# Patient Record
Sex: FEMALE | Race: WHITE | NOT HISPANIC OR LATINO | Employment: FULL TIME | URBAN - METROPOLITAN AREA
[De-identification: names, ages, dates, MRNs, and addresses within clinical notes are randomized per-mention and may not be internally consistent; named-entity substitution may affect disease eponyms.]

---

## 2024-06-27 ENCOUNTER — TRANSFERRED RECORDS (OUTPATIENT)
Dept: HEALTH INFORMATION MANAGEMENT | Facility: CLINIC | Age: 49
End: 2024-06-27
Payer: COMMERCIAL

## 2024-07-17 ENCOUNTER — OFFICE VISIT (OUTPATIENT)
Dept: CARDIOLOGY | Facility: CLINIC | Age: 49
End: 2024-07-17
Payer: COMMERCIAL

## 2024-07-17 VITALS
HEART RATE: 95 BPM | SYSTOLIC BLOOD PRESSURE: 128 MMHG | WEIGHT: 141.8 LBS | OXYGEN SATURATION: 99 % | RESPIRATION RATE: 20 BRPM | DIASTOLIC BLOOD PRESSURE: 82 MMHG | BODY MASS INDEX: 28.59 KG/M2 | HEIGHT: 59 IN

## 2024-07-17 DIAGNOSIS — R55 PRE-SYNCOPE: ICD-10-CM

## 2024-07-17 DIAGNOSIS — R42 LIGHT HEADEDNESS: Primary | ICD-10-CM

## 2024-07-17 DIAGNOSIS — I10 ESSENTIAL HYPERTENSION: ICD-10-CM

## 2024-07-17 DIAGNOSIS — F17.210 CIGARETTE NICOTINE DEPENDENCE WITHOUT COMPLICATION: ICD-10-CM

## 2024-07-17 PROCEDURE — 99204 OFFICE O/P NEW MOD 45 MIN: CPT | Performed by: INTERNAL MEDICINE

## 2024-07-17 RX ORDER — DEXTROAMPHETAMINE SACCHARATE, AMPHETAMINE ASPARTATE MONOHYDRATE, DEXTROAMPHETAMINE SULFATE AND AMPHETAMINE SULFATE 7.5; 7.5; 7.5; 7.5 MG/1; MG/1; MG/1; MG/1
1 CAPSULE, EXTENDED RELEASE ORAL DAILY
COMMUNITY
Start: 2024-06-11

## 2024-07-17 RX ORDER — DEXTROAMPHETAMINE SACCHARATE, AMPHETAMINE ASPARTATE, DEXTROAMPHETAMINE SULFATE AND AMPHETAMINE SULFATE 2.5; 2.5; 2.5; 2.5 MG/1; MG/1; MG/1; MG/1
10 TABLET ORAL PRN
COMMUNITY
Start: 2024-06-11

## 2024-07-17 RX ORDER — LISINOPRIL 10 MG/1
2 TABLET ORAL DAILY
COMMUNITY
Start: 2024-07-01 | End: 2025-07-01

## 2024-07-17 RX ORDER — FLUOXETINE 40 MG/1
1 CAPSULE ORAL DAILY
COMMUNITY
Start: 2024-06-13

## 2024-07-17 NOTE — PROGRESS NOTES
[facility]   [address]  [place] [address]   [email or web address]   OFFICE: [phone]     CARDIOLOGY CLINIC NOTE     Thank you, Ariel Noguera, for asking the [facility] team to see Ms. Gabriela Castillo to evaluate New Patient         Assessment/Recommendations   Assessment:    Near-syncope - most likely related to hyponatremia and hydrochlorothiazide given significant improvement in symptoms after stopping hydrochlorothiazide. With minimal symptoms in the past few weeks, will evaluate with an echocardiogram and defer on a cardiac rhythm monitor unless she develops recurrent symptoms.  Hypertension - reasonably controlled.  Nicotine use - smokes about 1/2 ppd for 30 years.    Plan:  Echocardiogram  Consider cardiac rhythm monitor with recurrent symptoms.  Strongly encouraged smoking cessation.  Follow up as needed           History of Present Illness   Ms. Gabriela Castillo is a 49 year old female with a significant past history of hypertension who presents for evaluation of dizziness.    Ms. Castillo was recently hospitalized for hyponatremia. She presented with dizziness and light headedness. Her sodium was 127. She was treated with IV fluids and her hydrochlorothiazide was discontinued. Since this hospitalization she has generally felt much better. She has had only one episode of mild light headedness, but no near-syncope.     Other than noted above, Ms. Castillo denies any chest pain/pressure/tightness, shortness of breath at rest or with exertion, light headedness/dizziness, pre-syncope, syncope, lower extremity swelling, palpitations, paroxysmal nocturnal dyspnea (PND), or orthopnea.     Cardiac Problems and Cardiac Diagnostics     Most Recent Cardiac testing:  ECG dated 6/27/24 (personaly reviewed and interpreted): normal sinus rhythm.      Stress test: dated 7/31/23 revealed   FINAL CONCLUSIONS   EXERCISE Stress Echocardiogram is  "NORMAL.   There is no echocardiographic evidence of ischemia.   Technically difficult - contrast was used to enhance endocardial definition secondary to subotimal image quality.   The stress ECG was negative for ischemia.          Medications  Allergies   Current Outpatient Medications   Medication Sig Dispense Refill    amphetamine-dextroamphetamine (ADDERALL XR) 30 MG 24 hr capsule Take 1 capsule by mouth daily      amphetamine-dextroamphetamine (ADDERALL) 10 MG tablet Take 10 mg by mouth as needed      FLUoxetine (PROZAC) 40 MG capsule Take 1 capsule by mouth daily      lisinopril (ZESTRIL) 10 MG tablet Take 2 tablets by mouth daily        No Known Allergies     Physical Examination Review of Systems   Vitals: /82 (BP Location: Left arm, Patient Position: Sitting, Cuff Size: Adult Regular)   Pulse 95   Resp 20   Ht 1.492 m (4' 10.75\")   Wt 64.3 kg (141 lb 12.8 oz)   SpO2 99%   BMI 28.88 kg/m    BMI= Body mass index is 28.88 kg/m .  Wt Readings from Last 3 Encounters:   07/17/24 64.3 kg (141 lb 12.8 oz)   04/10/14 55.3 kg (122 lb)       General: pleasant female. No acute distress.   Neck: No JVD  Lungs: clear to auscultation  COR: normal rate, regular rhythm, No murmurs, rubs, or gallops  Extrem: No edema        Please refer above for cardiac ROS details.       Past History     Family History: father had atrial fibrillation    Social History:   Social History     Socioeconomic History    Marital status: Single     Spouse name: Not on file    Number of children: Not on file    Years of education: Not on file    Highest education level: Not on file   Occupational History    Not on file   Tobacco Use    Smoking status: Every Day     Current packs/day: 0.50     Average packs/day: 0.5 packs/day for 33.5 years (16.8 ttl pk-yrs)     Types: Cigarettes     Start date: 1991     Passive exposure: Current    Smokeless tobacco: Never   Substance and Sexual Activity    Alcohol use: Not on file    Drug use: Not " "Currently     Types: Marijuana    Sexual activity: Not on file   Other Topics Concern    Not on file   Social History Narrative    Not on file     Social Determinants of Health     Financial Resource Strain: Not on file   Food Insecurity: Not on file   Transportation Needs: Not on file   Physical Activity: Not on file   Stress: Not on file   Social Connections: Not on file   Interpersonal Safety: Not on file   Housing Stability: Not on file            Lab Results    Chemistry/lipid CBC Cardiac Enzymes/BNP/TSH/INR   No results found for: \"CHOL\", \"HDL\", \"TRIG\", \"CHOLHDL\", \"CREATININE\", \"BUN\", \"NA\", \"CO2\" No results found for: \"WBC\", \"HGB\", \"HCT\", \"MCV\", \"PLT\" No results found for: \"CKTOTAL\", \"CKMB\", \"TROPONINI\", \"BNP\", \"TSH\", \"INR\"         "

## 2024-07-17 NOTE — PATIENT INSTRUCTIONS
It was a pleasure to meet with you today.      Below is a summary of your visit.   Someone will call you to schedule an echocardiogram to assess your heart function.   Assuming your echo is okay we will wait to see if you have more and recurrent symptoms before pursuing a more in depth heart rhythm monitor.   I strongly encourage you to quit smoking.   Follow up with me with recurrent symptoms or new questions/concerns.    We will call you to inform you of your test or procedure results within 3 business days of the test being performed.  If you do not hear from our office with the test results within 1 week please do not hesitate to call asking for these results.     Please do not hesitate to call the [facility] with any questions or concerns at [phone]. You can also reach my nurse, Alexandra, at [phone].    Sincerely,

## 2024-07-17 NOTE — LETTER
7/17/2024    Ariel Loja MD  [address] [place] [address]    RE: Gabriela Castillo       Dear Colleague,     I had the pleasure of seeing Gabriela Castillo in the [facility].    [facility]   [address]  [place] [address]   [email or web address]   OFFICE: [phone]     CARDIOLOGY CLINIC NOTE     Thank you, Ariel Noguera, for asking the [facility] team to see Ms. Gabriela Castillo to evaluate New Patient         Assessment/Recommendations   Assessment:    Near-syncope - most likely related to hyponatremia and hydrochlorothiazide given significant improvement in symptoms after stopping hydrochlorothiazide. With minimal symptoms in the past few weeks, will evaluate with an echocardiogram and defer on a cardiac rhythm monitor unless she develops recurrent symptoms.  Hypertension - reasonably controlled.  Nicotine use - smokes about 1/2 ppd for 30 years.    Plan:  Echocardiogram  Consider cardiac rhythm monitor with recurrent symptoms.  Strongly encouraged smoking cessation.  Follow up as needed           History of Present Illness   Ms. Gabriela Castillo is a 49 year old female with a significant past history of hypertension who presents for evaluation of dizziness.    Ms. Castillo was recently hospitalized for hyponatremia. She presented with dizziness and light headedness. Her sodium was 127. She was treated with IV fluids and her hydrochlorothiazide was discontinued. Since this hospitalization she has generally felt much better. She has had only one episode of mild light headedness, but no near-syncope.     Other than noted above, Ms. Castillo denies any chest pain/pressure/tightness, shortness of breath at rest or with exertion, light headedness/dizziness, pre-syncope, syncope, lower extremity swelling, palpitations, paroxysmal nocturnal dyspnea (PND), or orthopnea.     Cardiac Problems and Cardiac Diagnostics     Most  "Recent Cardiac testing:  ECG dated 6/27/24 (personaly reviewed and interpreted): normal sinus rhythm.      Stress test: dated 7/31/23 revealed   FINAL CONCLUSIONS   EXERCISE Stress Echocardiogram is NORMAL.   There is no echocardiographic evidence of ischemia.   Technically difficult - contrast was used to enhance endocardial definition secondary to subotimal image quality.   The stress ECG was negative for ischemia.          Medications  Allergies   Current Outpatient Medications   Medication Sig Dispense Refill    amphetamine-dextroamphetamine (ADDERALL XR) 30 MG 24 hr capsule Take 1 capsule by mouth daily      amphetamine-dextroamphetamine (ADDERALL) 10 MG tablet Take 10 mg by mouth as needed      FLUoxetine (PROZAC) 40 MG capsule Take 1 capsule by mouth daily      lisinopril (ZESTRIL) 10 MG tablet Take 2 tablets by mouth daily        No Known Allergies     Physical Examination Review of Systems   Vitals: /82 (BP Location: Left arm, Patient Position: Sitting, Cuff Size: Adult Regular)   Pulse 95   Resp 20   Ht 1.492 m (4' 10.75\")   Wt 64.3 kg (141 lb 12.8 oz)   SpO2 99%   BMI 28.88 kg/m    BMI= Body mass index is 28.88 kg/m .  Wt Readings from Last 3 Encounters:   07/17/24 64.3 kg (141 lb 12.8 oz)   04/10/14 55.3 kg (122 lb)       General: pleasant female. No acute distress.   Neck: No JVD  Lungs: clear to auscultation  COR: normal rate, regular rhythm, No murmurs, rubs, or gallops  Extrem: No edema        Please refer above for cardiac ROS details.       Past History     Family History: father had atrial fibrillation    Social History:   Social History     Socioeconomic History    Marital status: Single     Spouse name: Not on file    Number of children: Not on file    Years of education: Not on file    Highest education level: Not on file   Occupational History    Not on file   Tobacco Use    Smoking status: Every Day     Current packs/day: 0.50     Average packs/day: 0.5 packs/day for 33.5 years " "(16.8 ttl pk-yrs)     Types: Cigarettes     Start date: 1991     Passive exposure: Current    Smokeless tobacco: Never   Substance and Sexual Activity    Alcohol use: Not on file    Drug use: Not Currently     Types: Marijuana    Sexual activity: Not on file   Other Topics Concern    Not on file   Social History Narrative    Not on file     Social Determinants of Health     Financial Resource Strain: Not on file   Food Insecurity: Not on file   Transportation Needs: Not on file   Physical Activity: Not on file   Stress: Not on file   Social Connections: Not on file   Interpersonal Safety: Not on file   Housing Stability: Not on file            Lab Results    Chemistry/lipid CBC Cardiac Enzymes/BNP/TSH/INR   No results found for: \"CHOL\", \"HDL\", \"TRIG\", \"CHOLHDL\", \"CREATININE\", \"BUN\", \"NA\", \"CO2\" No results found for: \"WBC\", \"HGB\", \"HCT\", \"MCV\", \"PLT\" No results found for: \"CKTOTAL\", \"CKMB\", \"TROPONINI\", \"BNP\", \"TSH\", \"INR\"             Thank you for allowing me to participate in the care of your patient.      Sincerely,     Christiano Beck MD     [facility]  cc:   Referred Self,   "

## 2024-07-20 ENCOUNTER — HEALTH MAINTENANCE LETTER (OUTPATIENT)
Age: 49
End: 2024-07-20

## 2024-07-23 ENCOUNTER — TELEPHONE (OUTPATIENT)
Dept: CARDIOLOGY | Facility: CLINIC | Age: 49
End: 2024-07-23

## 2024-07-23 ENCOUNTER — HOSPITAL ENCOUNTER (OUTPATIENT)
Dept: CARDIOLOGY | Facility: HOSPITAL | Age: 49
Discharge: HOME | End: 2024-07-23
Attending: INTERNAL MEDICINE | Admitting: INTERNAL MEDICINE
Payer: COMMERCIAL

## 2024-07-23 DIAGNOSIS — R42 LIGHT HEADEDNESS: ICD-10-CM

## 2024-07-23 DIAGNOSIS — R55 PRE-SYNCOPE: ICD-10-CM

## 2024-07-23 LAB — LVEF ECHO: NORMAL

## 2024-07-23 PROCEDURE — 93306 TTE W/DOPPLER COMPLETE: CPT

## 2024-07-23 PROCEDURE — 93306 TTE W/DOPPLER COMPLETE: CPT | Mod: 26 | Performed by: INTERNAL MEDICINE

## 2024-07-23 NOTE — TELEPHONE ENCOUNTER
[facility] Call Center    Phone Message    May a detailed message be left on voicemail: yes     Reason for Call: Requesting Results     Name/type of test: echo  Date of test: 7/23  Was test done at a location other than [facility] (Please fill in the location if not [facility])?: No    Action Taken: Other: cardio    Travel Screening: Not Applicable     Date of Service:

## 2024-07-23 NOTE — TELEPHONE ENCOUNTER
Spoke with Pt regarding preliminary review of results, once provider has reviewed echo writer will follow up-veronica

## 2025-08-09 ENCOUNTER — HEALTH MAINTENANCE LETTER (OUTPATIENT)
Age: 50
End: 2025-08-09